# Patient Record
Sex: FEMALE | Race: WHITE | Employment: STUDENT | URBAN - METROPOLITAN AREA
[De-identification: names, ages, dates, MRNs, and addresses within clinical notes are randomized per-mention and may not be internally consistent; named-entity substitution may affect disease eponyms.]

---
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
## 2021-04-20 ENCOUNTER — OFFICE VISIT (OUTPATIENT)
Dept: INTERNAL MEDICINE CLINIC | Age: 21
End: 2021-04-20
Payer: COMMERCIAL

## 2021-04-20 VITALS
HEART RATE: 107 BPM | WEIGHT: 150.38 LBS | OXYGEN SATURATION: 97 % | TEMPERATURE: 98.7 F | DIASTOLIC BLOOD PRESSURE: 83 MMHG | RESPIRATION RATE: 16 BRPM | BODY MASS INDEX: 22.27 KG/M2 | HEIGHT: 69 IN | SYSTOLIC BLOOD PRESSURE: 107 MMHG

## 2021-04-20 DIAGNOSIS — F32.A DEPRESSION, UNSPECIFIED DEPRESSION TYPE: Primary | ICD-10-CM

## 2021-04-20 DIAGNOSIS — K21.00 GASTROESOPHAGEAL REFLUX DISEASE WITH ESOPHAGITIS, UNSPECIFIED WHETHER HEMORRHAGE: ICD-10-CM

## 2021-04-20 DIAGNOSIS — Z76.89 ENCOUNTER TO ESTABLISH CARE: ICD-10-CM

## 2021-04-20 PROCEDURE — 99204 OFFICE O/P NEW MOD 45 MIN: CPT | Performed by: INTERNAL MEDICINE

## 2021-04-20 RX ORDER — SERTRALINE HYDROCHLORIDE 100 MG/1
100 TABLET, FILM COATED ORAL DAILY
Qty: 30 TAB | Refills: 5 | Status: SHIPPED | OUTPATIENT
Start: 2021-04-20 | End: 2021-10-21 | Stop reason: SDUPTHER

## 2021-04-20 RX ORDER — PANTOPRAZOLE SODIUM 40 MG/1
40 TABLET, DELAYED RELEASE ORAL DAILY
Qty: 30 TAB | Refills: 5 | Status: SHIPPED | OUTPATIENT
Start: 2021-04-20

## 2021-04-20 NOTE — PROGRESS NOTES
Mayank Delgado (: 2000) is a 24 y.o. female, new patient, here for evaluation of the following chief complaint(s):  Chief Complaint   Patient presents with    New Patient     Patient not fasting.  Establish Care       Assessment and Plan:       ICD-10-CM ICD-9-CM    1. Depression, unspecified depression type  F32.9 311 sertraline (Zoloft) 100 mg tablet   2. Encounter to establish care  Z76.89 V65.8    3. Gastroesophageal reflux disease with esophagitis, unspecified whether hemorrhage  K21.00 530.11 pantoprazole (PROTONIX) 40 mg tablet      REFERRAL TO GASTROENTEROLOGY       1. Continue current medication reviewed with pt at visit. 3.  Trial medication and GI evaluation reviewed. Follow-up and Dispositions    · Return in about 3 months (around 2021), or if symptoms worsen or fail to improve, for medication follow-up, referral follow-up.       reviewed diet, exercise and weight control  reviewed medications and side effects in detail    For additional documentation of information and/or recommendations discussed this visit, please see notes in instructions. Plan and evaluation (above) reviewed with pt at visit  Patient voiced understanding of plan and provided with time to ask/review questions. After Visit Summary (AVS) provided to pt after visit with additional instructions as needed/reviewed. No future appointments. --Updated future visits after patient check-out. History of Present Illness:     Notes (nursing/rooming note copied below in italics):  As above    Here to establish care. Pt has records in [facility]. Records reviewed at visit as below:  --Path and labs for appendectomy   --Had post-op visit for laparoscopic appy      She notes she has been with Dr. Kristen Coronado for her primary care and medication follow-up. She has been on Zoloft for past 6-7 years.     She has been on Zoloft current dose for several years--reached this dosing in first year of treatment. She saw Dr. Burnard Hodgkin last 3mo ago, and did physical there then. Notes no other chronic medical problems. 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 No long-term problems. No regular ortho follow-up since surgery. She notes concern for acid reflux. Notes worse over past few years. She was taking Zantac and not helping. She notes will make a loud hiccuping sound, and will have this with acid reflux which is painful. This occurs up to 14 times daily for past 6mo. Has been having for past 4yrs. Will have up to BID then, now more frequent and more painful. Medication dosing and referral for GI eval reviewed. Release(s) completed at visit for:  PCP notes, last physical, labs, immunization records. Nursing screenings reviewed by provider at visit. Past Medical History:   Diagnosis Date    Anxiety     Depression     GERD (gastroesophageal reflux disease)      Past Surgical History:   Procedure Laterality Date    HX APPENDECTOMY  01/11/2018    appendectomy by Dr. Yenifer Rendon        Prior to Admission medications    Medication Sig Start Date End Date Taking? Authorizing Provider   sertraline (ZOLOFT) 100 mg tablet Take 100 mg by mouth daily. Yes Other, MD Vickie   oxyCODONE-acetaminophen (PERCOCET) 5-325 mg per tablet Take 1-2 Tabs by mouth every four (4) hours as needed for Pain. Max Daily Amount: 12 Tabs. 1/12/18   Ladarius Inman MD   multivit-min-FA-herbal no. 245 (ALIVE WOMEN'S GUMMY VITAMINS) 200 mcg- 37.5 mg chew Take 1 Tab by mouth daily. Provider, Historical        ROS  Complete ROS negative except as indicated in note. Vitals:    04/20/21 1342   BP: 107/83   Pulse: (!) 107   Resp: 16   Temp: 98.7 °F (37.1 °C)   TempSrc: Oral   SpO2: 97%   Weight: 150 lb 6 oz (68.2 kg)   Height: 5' 9\" (1.753 m)   PainSc:   0 - No pain      Body mass index is 22.21 kg/m². Physical Exam:     Physical Exam  Vitals signs and nursing note reviewed.    Constitutional: General: She is not in acute distress. Appearance: Normal appearance. She is well-developed. She is not diaphoretic. HENT:      Head: Normocephalic and atraumatic. Mouth/Throat:      Mouth: Mucous membranes are moist.   Eyes:      General: No scleral icterus. Right eye: No discharge. Left eye: No discharge. Conjunctiva/sclera: Conjunctivae normal.   Neck:      Musculoskeletal: Neck supple. No neck rigidity or muscular tenderness. Cardiovascular:      Rate and Rhythm: Normal rate and regular rhythm. Pulses: Normal pulses. Heart sounds: Normal heart sounds. No murmur. No friction rub. No gallop. Pulmonary:      Effort: Pulmonary effort is normal. No respiratory distress. Breath sounds: Normal breath sounds. No stridor. No wheezing, rhonchi or rales. Abdominal:      General: Bowel sounds are normal. There is no distension. Palpations: Abdomen is soft. There is no mass. Tenderness: There is no abdominal tenderness. There is no guarding. Comments: No mid-epigastric or bilateral UQ pain. Musculoskeletal:         General: No swelling, tenderness, deformity or signs of injury. Right lower leg: No edema. Left lower leg: No edema. Legs:    Lymphadenopathy:      Cervical: No cervical adenopathy. Skin:     General: Skin is warm. Coloration: Skin is not jaundiced or pale. Findings: No bruising, erythema or rash. Neurological:      General: No focal deficit present. Mental Status: She is alert. Motor: No abnormal muscle tone. Coordination: Coordination normal.      Gait: Gait normal.   Psychiatric:         Mood and Affect: Mood normal.         Behavior: Behavior normal.         Thought Content: Thought content normal.         Judgment: Judgment normal.         An electronic signature was used to authenticate this note.   -- Roxann Mcelroy MD

## 2021-04-20 NOTE — PROGRESS NOTES
RM 16    Chief Complaint   Patient presents with    New Patient     Patient not fasting.  Establish Care       1. Have you been to the ER, urgent care clinic since your last visit? Hospitalized since your last visit? No    2. Have you seen or consulted any other health care providers outside of the [address] [place] [address] since your last visit? Include any pap smears or colon screening. No    Health Maintenance Due   Topic Date Due    Hepatitis C Screening  Never done    HPV Age 9Y-34Y (1 - 2-dose series) Never done    COVID-19 Vaccine (1) Never done    DTaP/Tdap/Td series (1 - Tdap) Never done    PAP AKA CERVICAL CYTOLOGY  Never done       No flowsheet data found.     3 most recent PHQ Screens 1/31/2018   PHQ Not Done Active Diagnosis of Depression or Bipolar Disorder       Learning Assessment 1/31/2018   PRIMARY LEARNER Patient   HIGHEST LEVEL OF EDUCATION - PRIMARY LEARNER  DID NOT GRADUATE HIGH SCHOOL   BARRIERS PRIMARY LEARNER NONE   PRIMARY LANGUAGE ENGLISH   LEARNER PREFERENCE PRIMARY DEMONSTRATION     LISTENING   LEARNING SPECIAL TOPICS no   ANSWERED BY self   RELATIONSHIP SELF

## 2021-10-21 DIAGNOSIS — F32.A DEPRESSION, UNSPECIFIED DEPRESSION TYPE: ICD-10-CM

## 2021-10-21 NOTE — TELEPHONE ENCOUNTER
Please contact patient for scheduling. Thanks      Pt was a pt of Dr. Josue Pérez and has now established care with Dr. Isiah Yancey. Pt left a voice message requesting a refill. Last visit 04/20/2021 MD Aba Whiting   Next appointment 3 months (07/2021) - Nothing scheduled   Previous refill encounter(s)   04/20/2021 Zoloft #30 with 5 refills    Requested Prescriptions     Pending Prescriptions Disp Refills    sertraline (Zoloft) 100 mg tablet 90 Tablet 0     Sig: Take 1 Tablet by mouth daily.

## 2021-10-23 RX ORDER — SERTRALINE HYDROCHLORIDE 100 MG/1
100 TABLET, FILM COATED ORAL DAILY
Qty: 90 TABLET | Refills: 0 | Status: SHIPPED | OUTPATIENT
Start: 2021-10-23 | End: 2021-10-25 | Stop reason: SDUPTHER

## 2021-10-24 NOTE — TELEPHONE ENCOUNTER
Refill request(s) approved--sertraline--90-day supply with 0 refill(s).     Future Appointments   Date Time Provider Daron Daylin   10/25/2021  2:15 PM Obie Victoria MD [facility]

## 2021-10-25 ENCOUNTER — OFFICE VISIT (OUTPATIENT)
Dept: INTERNAL MEDICINE CLINIC | Age: 21
End: 2021-10-25
Payer: COMMERCIAL

## 2021-10-25 VITALS
SYSTOLIC BLOOD PRESSURE: 100 MMHG | RESPIRATION RATE: 16 BRPM | DIASTOLIC BLOOD PRESSURE: 69 MMHG | OXYGEN SATURATION: 98 % | BODY MASS INDEX: 22.81 KG/M2 | HEIGHT: 69 IN | HEART RATE: 74 BPM | WEIGHT: 154 LBS | TEMPERATURE: 97.6 F

## 2021-10-25 DIAGNOSIS — F32.A DEPRESSION, UNSPECIFIED DEPRESSION TYPE: Primary | ICD-10-CM

## 2021-10-25 DIAGNOSIS — H92.03 EAR PAIN, BILATERAL: ICD-10-CM

## 2021-10-25 PROCEDURE — 99214 OFFICE O/P EST MOD 30 MIN: CPT | Performed by: INTERNAL MEDICINE

## 2021-10-25 RX ORDER — SERTRALINE HYDROCHLORIDE 100 MG/1
100 TABLET, FILM COATED ORAL DAILY
Qty: 90 TABLET | Refills: 1 | Status: SHIPPED | OUTPATIENT
Start: 2021-10-25 | End: 2022-05-20 | Stop reason: SDUPTHER

## 2021-10-25 NOTE — PROGRESS NOTES
Riesa Hammans (: 2000) is a 24 y.o. female, established patient, here for evaluation of the following chief complaint(s):  Chief Complaint   Patient presents with    Medication Evaluation    Other     Endoscopy since last visit       Assessment and Plan:       ICD-10-CM ICD-9-CM    1. Depression, unspecified depression type  F32. A 311 sertraline (Zoloft) 100 mg tablet   2. Ear pain, bilateral  H92.03 388.70        1. Refill reviewed--6mo supply as 90-day script reviewed. Prior had been 90-day with no refills. Reviewed dose decrease if ready at follow-up as discussed at visit. 2.  Reviewed findings and mgt with pt at visit. No need for antibiotic reviewed with pt based on findings. Continue OTC Flonase PRN as currently. Follow-up and Dispositions    · Return in about 6 months (around 2022), or if symptoms worsen or fail to improve, for medication follow-up.       reviewed medications and side effects in detail    For additional documentation of information and/or recommendations discussed this visit, please see notes in instructions. Plan and evaluation (above) reviewed with pt at visit  Patient voiced understanding of plan and provided with time to ask/review questions. After Visit Summary (AVS) provided to pt after visit with additional instructions as needed/reviewed. No future appointments. --Updated future visits after patient check-out. History of Present Illness:     Notes (nursing/rooming note copied below in italics):  Patient states she would like to try to get off of anti depressants at some point. Here for medication follow-up. Established care 2021 with me. Reviewed history of sertraline use. She notes since 13yr to 12yr old has been on medication. Reviewed dosing if planning to decrease. If she is interested, may do so prior to follow-up. Reviewed and thinks can split tab if needed to get to 50mg dose.     Discussed 50mg/100mg alternating, or just decrease to 50mg daily. She has had SE's when misses doses. Gradual taper off medication reviewed PRN. Reviewed if does 4-6 weeks prior to follow-up can monitor response. She has seasonal/change of season infections--managed with peds prior with antibiotics PRN. Reviewed and she uses Flonase with season change to help manage possible Eustachian tube dysfunction. Ear exam reviewed at visit today. Nursing screenings reviewed by provider at visit. Prior to Admission medications    Medication Sig Start Date End Date Taking? Authorizing Provider   sertraline (Zoloft) 100 mg tablet Take 1 Tablet by mouth daily. 10/23/21  Yes Leo Bauman MD   pantoprazole (PROTONIX) 40 mg tablet Take 1 Tab by mouth daily. 4/20/21  Yes Leo Bauman MD        ROS    Vitals:    10/25/21 1432   BP: 100/69   Pulse: 74   Resp: 16   Temp: 97.6 °F (36.4 °C)   SpO2: 98%   Weight: 154 lb (69.9 kg)   Height: 5' 9\" (1.753 m)   PainSc:   0 - No pain   LMP: 09/24/2021      Body mass index is 22.74 kg/m². Physical Exam:     Physical Exam  Vitals and nursing note reviewed. Constitutional:       General: She is not in acute distress. Appearance: Normal appearance. She is well-developed. She is not diaphoretic. HENT:      Head: Normocephalic and atraumatic. Right Ear: Tympanic membrane, ear canal and external ear normal.      Left Ear: Tympanic membrane, ear canal and external ear normal.      Ears:      Comments: Right TM with small amount fluid possibly--reviewed with pt. Right TM slightly unclear/not transparent/translucent, with slightly splayed light reflex. Mouth/Throat:      Mouth: Mucous membranes are moist.   Eyes:      General: No scleral icterus. Right eye: No discharge. Left eye: No discharge. Conjunctiva/sclera: Conjunctivae normal.   Cardiovascular:      Rate and Rhythm: Normal rate and regular rhythm. Pulses: Normal pulses. Heart sounds: Normal heart sounds. No murmur heard. No friction rub. No gallop. Pulmonary:      Effort: Pulmonary effort is normal. No respiratory distress. Breath sounds: Normal breath sounds. No stridor. No wheezing, rhonchi or rales. Abdominal:      General: Bowel sounds are normal. There is no distension. Palpations: Abdomen is soft. Tenderness: There is no abdominal tenderness. There is no guarding. Musculoskeletal:         General: No swelling, tenderness, deformity or signs of injury. Skin:     General: Skin is warm. Coloration: Skin is not jaundiced or pale. Findings: No bruising, erythema or rash. Neurological:      General: No focal deficit present. Mental Status: She is alert. Motor: No abnormal muscle tone. Coordination: Coordination normal.      Gait: Gait normal.   Psychiatric:         Mood and Affect: Mood normal.         Behavior: Behavior normal.         Thought Content: Thought content normal.         Judgment: Judgment normal.         An electronic signature was used to authenticate this note.   -- Jonny Brower MD

## 2021-10-25 NOTE — PROGRESS NOTES
Chief Complaint   Patient presents with    Medication Evaluation     Patient states she would like to try to get off of anti depressants at some point. 3 most recent PHQ Screens 10/25/2021   PHQ Not Done -   Little interest or pleasure in doing things Not at all   Feeling down, depressed, irritable, or hopeless Not at all   Total Score PHQ 2 0       Visit Vitals  /69 (BP 1 Location: Left arm, BP Patient Position: Sitting, BP Cuff Size: Small adult)   Pulse 74   Temp 97.6 °F (36.4 °C)   Resp 16   Ht 5' 9\" (1.753 m)   Wt 154 lb (69.9 kg)   SpO2 98%   BMI 22.74 kg/m²     1. Have you been to the ER, urgent care clinic since your last visit? Hospitalized since your last visit?no    2. Have you seen or consulted any other health care providers outside of the [address] [place] [address] since your last visit? Include any pap smears or colon screening.  no

## 2021-10-25 NOTE — PATIENT INSTRUCTIONS
As reviewed, you can decrease the sertraline to 50mg (1/2 of a 100mg tab) daily prior to your next visit.

## 2021-10-27 NOTE — TELEPHONE ENCOUNTER
Writer left message that her medication was approved and sent to her pharmacy,pt was just seen by  on 10/25/21.

## 2022-03-19 PROBLEM — K35.80 APPENDICITIS, ACUTE: Status: ACTIVE | Noted: 2018-01-12

## 2022-05-19 DIAGNOSIS — F32.A DEPRESSION, UNSPECIFIED DEPRESSION TYPE: ICD-10-CM

## 2022-05-19 RX ORDER — SERTRALINE HYDROCHLORIDE 100 MG/1
100 TABLET, FILM COATED ORAL DAILY
Qty: 90 TABLET | Refills: 0 | Status: CANCELLED | OUTPATIENT
Start: 2022-05-19

## 2022-05-19 NOTE — TELEPHONE ENCOUNTER
Last visit 10/25/2021 MD Danielle Dee   Next appointment 05/20/2022 MD Danielle Dee   Previous refill encounter(s)   10/25/2021 Zoloft #90 with 1 refill. Requested Prescriptions     Pending Prescriptions Disp Refills    sertraline (Zoloft) 100 mg tablet 90 Tablet 0     Sig: Take 1 Tablet by mouth daily.

## 2022-05-20 ENCOUNTER — OFFICE VISIT (OUTPATIENT)
Dept: INTERNAL MEDICINE CLINIC | Age: 22
End: 2022-05-20
Payer: COMMERCIAL

## 2022-05-20 VITALS
RESPIRATION RATE: 22 BRPM | WEIGHT: 165 LBS | BODY MASS INDEX: 24.37 KG/M2 | HEART RATE: 80 BPM | TEMPERATURE: 98.2 F | SYSTOLIC BLOOD PRESSURE: 116 MMHG | DIASTOLIC BLOOD PRESSURE: 77 MMHG | OXYGEN SATURATION: 100 %

## 2022-05-20 DIAGNOSIS — F32.A DEPRESSION, UNSPECIFIED DEPRESSION TYPE: ICD-10-CM

## 2022-05-20 DIAGNOSIS — Z00.00 WELL ADULT HEALTH CHECK: ICD-10-CM

## 2022-05-20 DIAGNOSIS — G90.9 AUTONOMIC DYSFUNCTION: ICD-10-CM

## 2022-05-20 DIAGNOSIS — R55 SYNCOPE, UNSPECIFIED SYNCOPE TYPE: Primary | ICD-10-CM

## 2022-05-20 PROCEDURE — 99215 OFFICE O/P EST HI 40 MIN: CPT | Performed by: INTERNAL MEDICINE

## 2022-05-20 RX ORDER — UREA 10 %
100 LOTION (ML) TOPICAL DAILY
COMMUNITY

## 2022-05-20 RX ORDER — ERGOCALCIFEROL 1.25 MG/1
50000 CAPSULE ORAL
COMMUNITY

## 2022-05-20 RX ORDER — CHOLECALCIFEROL (VITAMIN D3) 125 MCG
CAPSULE ORAL
COMMUNITY

## 2022-05-20 RX ORDER — SERTRALINE HYDROCHLORIDE 100 MG/1
100 TABLET, FILM COATED ORAL DAILY
Qty: 90 TABLET | Refills: 1 | Status: SHIPPED | OUTPATIENT
Start: 2022-05-20

## 2022-05-20 NOTE — PROGRESS NOTES
Identified pt with two pt identifiers(name and ). Chief Complaint   Patient presents with    Loss of Consciousness     History since  5years old, approximately happens 1-2 a month. Post Covid December, happens 3-4 times a week     Other     Rm 16      Vitals:    22 1105   BP: 116/77   Pulse: 80   Resp: 22   Temp: 98.2 °F (36.8 °C)   TempSrc: Oral   SpO2: 100%   Weight: 165 lb (74.8 kg)   PainSc:   0 - No pain      Health Maintenance Due   Topic    Hepatitis C Screening     HPV Age 9Y-34Y (1 - 2-dose series)    DTaP/Tdap/Td series (1 - Tdap)    Pap Smear     COVID-19 Vaccine (2 - Moderna 3-dose series)       Depression Screening:  :     3 most recent PHQ Screens 2022   PHQ Not Done -   Little interest or pleasure in doing things Not at all   Feeling down, depressed, irritable, or hopeless Not at all   Total Score PHQ 2 0        Fall Risk Assessment:  :     No flowsheet data found. Abuse Screening:  :     No flowsheet data found. Coordination of Care Questionnaire:  :     1. Have you been to the ER, urgent care clinic since your last visit? Hospitalized since your last visit? No    2. Have you seen or consulted any other health care providers outside of the [address] [place] [address] since your last visit? Include any pap smears or colon screening.  No

## 2022-05-20 NOTE — PATIENT INSTRUCTIONS
1.  Labs are not fasting, but should be done by mid-morning. You can do here and have done in an exam room, so will be safer if you faint then with labs. 2.  Dr. Tara Kiran is not with [address] currently. Dr. Lizz Patton is a [address] provider. 3.  Please follow the following instructions to process/authorize your referral, if needed:    Referrals processing  Please verify with your insurance IF you need referral authorization submitted. For insurance plans which require this, please follow the following steps. FAILURE TO DO SO MAY RESULT IN INABILITY TO SEE THE SPECIALIST YOU HAVE BEEN REFERRED TO (once you are scheduled to see them). 1. Call and schedule appointment with specialist  2. Call our clinic and leave message with provider name, and date of appointment  3. We will then submit the referral to your insurance. This process takes 2-5 business days. If you have questions about scheduling or authorizing referral, you can review with our referral coordinator. You can review with her today if available/if you have time, or you can call to review once you have made your referral/appointment. If you are not sure if you need referral authorizations, please review with the referral coordinator(s), either prior to or after you have made the appointment, as reviewed.

## 2022-05-20 NOTE — PROGRESS NOTES
Moy Biswas (: 2000) is a 25 y.o. female, established patient, here for evaluation of the following chief complaint(s):  Chief Complaint   Patient presents with    Loss of Consciousness     History since  5years old, approximately happens 1-2 a month. Post Covid December, happens 3-4 times a week     Other     Rm 16       Assessment and Plan:       ICD-10-CM ICD-9-CM    1. Syncope, unspecified syncope type  R55 780.2 REFERRAL TO CARDIOLOGY      REFERRAL TO CARDIOLOGY      CBC WITH AUTOMATED DIFF      METABOLIC PANEL, COMPREHENSIVE      HEMOGLOBIN A1C WITH EAG      FOLATE      VITAMIN B12      TSH 3RD GENERATION      T4, FREE      CORTISOL, AM      VITAMIN D, 25 HYDROXY   2. Autonomic dysfunction  G90.9 337.9 REFERRAL TO CARDIOLOGY      REFERRAL TO CARDIOLOGY   3. Depression, unspecified depression type  F32. A 311 sertraline (Zoloft) 100 mg tablet   4. Well adult health check  Z00.00 V70.0 CBC WITH AUTOMATED DIFF      METABOLIC PANEL, COMPREHENSIVE      HEMOGLOBIN A1C WITH EAG      TSH 3RD GENERATION      T4, FREE       1,2,4. Referral(s) and referral coordination reviewed with patient at visit. Lab evaluation reviewed with pt at visit. She will return for AM labs here--since plan AM cortisol for evaluation. 3.  Refill and mgt/follow-up reviewed. Follow-up and Dispositions    · Return in about 2 months (around 2022), or if symptoms worsen or fail to improve, for referral follow-up.       lab results and schedule of future lab studies reviewed with patient  reviewed medications and side effects in detail  radiology results and schedule of future radiology studies reviewed with patient    For additional documentation of information and/or recommendations discussed this visit, please see notes in instructions. Plan and evaluation (above) reviewed with pt at visit  Patient voiced understanding of plan and provided with time to ask/review questions.   After Visit Summary (AVS) provided to pt after visit with additional instructions as needed/reviewed. Future Appointments   Date Time Provider Daron Daylin   7/20/2022 10:00 AM David Haines MD [facility]   --Updated future visits after patient check-out. On this date 05/20/2022 I have spent 41 minutes reviewing previous notes, test results and face to face with the patient discussing the diagnosis and importance of compliance with the treatment plan as well as documenting on the day of the visit. History of Present Illness:     Notes (nursing/rooming note copied below in italics):  As above. Established care here Oct 2021. Refilled Zoloft then, with 6mo refillsn and planned follow-up 6mo (4/25). First follow-up visit today. The problem noted above has not been evaluated prior here. She thinks saw cardiology in 2011 with Peds cardiology. She has been trying to do more Gatorade, and salt-intake. She has not seen cardiology as adult to evaluate. She had EKG only with cardiology. She will have about a 5second window with sweating, and vision changes. She doesn't usually collapse, but she can usually lay down with symptoms to prevent passing out/fainting. She has limited perspiration, and feeling like not regulating body temperature as much. She has noted this is worse after COVID. She would prefer to start with cardiology. Plan EKG there. Nursing screenings reviewed by provider at visit. Prior to Admission medications    Medication Sig Start Date End Date Taking? Authorizing Provider   cyanocobalamin (Vitamin B-12) 100 mcg tablet Take 100 mcg by mouth daily. Yes Provider, Historical   ergocalciferol (Vitamin D2) 1,250 mcg (50,000 unit) capsule Take 50,000 Units by mouth. Yes Provider, Historical   melatonin 5 mg tablet Take  by mouth nightly. Yes Provider, Historical   sertraline (Zoloft) 100 mg tablet Take 1 Tablet by mouth daily.  10/25/21  Yes Darryl Lam Karson Benavides MD   pantoprazole (PROTONIX) 40 mg tablet Take 1 Tab by mouth daily. Patient not taking: Reported on 5/20/2022 4/20/21   Gabino Evans MD        ROS    Vitals:    05/20/22 1105   BP: 116/77   Pulse: 80   Resp: 22   Temp: 98.2 °F (36.8 °C)   TempSrc: Oral   SpO2: 100%   Weight: 165 lb (74.8 kg)   PainSc:   0 - No pain        Vitals:    05/20/22 1105 05/20/22 1125 05/20/22 1126 05/20/22 1127   BP: 116/77      BP 2:  100/64 110/70 116/80   BP 1 Location: Right arm Right arm Right arm Right arm   BP Patient Position: Sitting Supine Sitting Standing   BP Cuff Size: Adult Adult Adult Adult   Pulse: 80      Pulse 2:  83 74 84   Temp: 98.2 °F (36.8 °C)      TempSrc: Oral      Resp: 22      Weight: 165 lb (74.8 kg)      SpO2: 100%          Body mass index is 24.37 kg/m². Physical Exam:     Physical Exam  Vitals and nursing note reviewed. Constitutional:       General: She is not in acute distress. Appearance: Normal appearance. She is well-developed. She is not diaphoretic. HENT:      Head: Normocephalic and atraumatic. Eyes:      General: No scleral icterus. Right eye: No discharge. Left eye: No discharge. Conjunctiva/sclera: Conjunctivae normal.   Cardiovascular:      Rate and Rhythm: Normal rate and regular rhythm. Pulses: Normal pulses. Heart sounds: Normal heart sounds. No murmur heard. No friction rub. No gallop. Pulmonary:      Effort: Pulmonary effort is normal. No respiratory distress. Breath sounds: Normal breath sounds. No stridor. No wheezing, rhonchi or rales. Abdominal:      General: Bowel sounds are normal. There is no distension. Palpations: Abdomen is soft. Tenderness: There is no abdominal tenderness. There is no guarding. Musculoskeletal:         General: No swelling, tenderness, deformity or signs of injury. Skin:     General: Skin is warm. Coloration: Skin is not jaundiced or pale.       Findings: No bruising, erythema or rash. Neurological:      General: No focal deficit present. Mental Status: She is alert. Motor: No abnormal muscle tone. Coordination: Coordination normal.      Gait: Gait normal.   Psychiatric:         Mood and Affect: Mood normal.         Behavior: Behavior normal.         Thought Content: Thought content normal.         Judgment: Judgment normal.         An electronic signature was used to authenticate this note.   -- Josiah Ortez MD

## 2022-05-28 NOTE — TELEPHONE ENCOUNTER
[place]   Outcome Expressed gratitude   Spiritual/[employer]   Type Spiritual support Sertraline refilled at visit on 5-20-22.

## 2022-10-20 ENCOUNTER — NURSE TRIAGE (OUTPATIENT)
Dept: OTHER | Facility: CLINIC | Age: 22
End: 2022-10-20

## 2022-10-20 NOTE — TELEPHONE ENCOUNTER
Location of patient: [address] nagel from Estefany Kim at [facility] with [employer]. Subjective: Caller states \"I have bronchitis\"     Current Symptoms: Cough for 3 weeks. Chest is hurting from coughing, feels sore even when not coughing. SOB with moving around. Got better with steroids and once completed those her sx got worse again. Coughing up green mucous, no blood. Can taste blood. Sore throat. Onset: 1 week ago; worsening    Associated Symptoms: reduced activity    Pain Severity: 5/10; sore; constant, moderate    Temperature: denies fever     What has been tried: Albuterol Inhaler    LMP:  Pregnant:     Recommended disposition: Go to ED Now    Care advice provided, patient verbalizes understanding; denies any other questions or concerns; instructed to call back for any new or worsening symptoms. Tried to reach PCP office and no answer after 10 minutes on hold. Still advised patient to be seen in ED and that will send message to the office as a high priority as well. Attention Provider: Thank you for allowing me to participate in the care of your patient. The patient was connected to triage in response to information provided to the [facility]. Please do not respond through this encounter as the response is not directed to a shared pool.     Reason for Disposition   Chest pain present when not coughing    Protocols used: Cough-ADULT-OH

## 2022-10-21 ENCOUNTER — TELEPHONE (OUTPATIENT)
Dept: INTERNAL MEDICINE CLINIC | Age: 22
End: 2022-10-21

## 2022-10-21 NOTE — TELEPHONE ENCOUNTER
Future Appointments:  Future Appointments   Date Time Provider Daron Daylin   10/21/2022  3:30 PM Chad Benavidez MD [facility] BS AMB        Last Appointment With Me:  5/20/2022     Above sick clinic visit booked attempted to call patient no answer left detailed message with appt info.

## 2022-10-21 NOTE — TELEPHONE ENCOUNTER
----- Message from Woodymeliton Malia sent at 10/20/2022  4:06 PM EDT -----  Subject: Message to Provider    QUESTIONS  Information for Provider? Patient calling for pcp Jae Bob MD to see if he got the message from Nurse Triage today. Wanting to talk   to someone asap rather than utilize nt disposition of go to ed now. Please   call asap to discuss patient needs. ---------------------------------------------------------------------------  --------------  Karen Carrera [facility]  [phone]; OK to leave message on voicemail  ---------------------------------------------------------------------------  --------------  SCRIPT ANSWERS  Relationship to Patient?  Self

## 2022-12-19 DIAGNOSIS — F32.A DEPRESSION, UNSPECIFIED DEPRESSION TYPE: ICD-10-CM

## 2022-12-19 NOTE — TELEPHONE ENCOUNTER
Last visit 05/20/2022 MD Katie Avila   Next appointment No show 10/21/2022 - Nothing rescheduled   Previous refill encounter(s)   05/20/2022 Zoloft #90 with 1 refill. For Pharmacy Admin Tracking Only    Program: Medication Refill  Intervention Detail: New Rx: 1, reason: Patient Preference  Time Spent (min): 5      Requested Prescriptions     Pending Prescriptions Disp Refills    sertraline (Zoloft) 100 mg tablet 90 Tablet 0     Sig: Take 1 Tablet by mouth daily.

## 2023-01-11 RX ORDER — SERTRALINE HYDROCHLORIDE 100 MG/1
100 TABLET, FILM COATED ORAL DAILY
Qty: 90 TABLET | Refills: 0 | Status: SHIPPED | OUTPATIENT
Start: 2023-01-11

## 2023-03-29 DIAGNOSIS — F32.A DEPRESSION, UNSPECIFIED DEPRESSION TYPE: ICD-10-CM

## 2023-03-30 NOTE — TELEPHONE ENCOUNTER
Last Visit: 5/20/22 with MD Boo Vargas  Next Appointment: no show 10/21/22  Previous Refill Encounter(s): 1/11/23 #90    Requested Prescriptions     Pending Prescriptions Disp Refills    sertraline (Zoloft) 100 mg tablet 90 Tablet 0     Sig: Take 1 Tablet by mouth daily. For Pharmacy Admin Tracking Only    Program: Medication Refill  CPA in place:   Recommendation Provided To:    Intervention Detail: New Rx: 1, reason: Patient Preference and Scheduled Appointment  Intervention Accepted By:   Azra Oden Closed?:   Time Spent (min): 5

## 2023-04-01 RX ORDER — SERTRALINE HYDROCHLORIDE 100 MG/1
100 TABLET, FILM COATED ORAL DAILY
Qty: 90 TABLET | Refills: 0 | Status: SHIPPED | OUTPATIENT
Start: 2023-04-01

## 2023-04-01 NOTE — TELEPHONE ENCOUNTER
Refill request(s) approved--sertraline--90-day supply with 0 refill(s). No future appointments. MyChart message to pt--to schedule follow-up appt.

## 2023-06-22 ENCOUNTER — TELEPHONE (OUTPATIENT)
Age: 23
End: 2023-06-22

## 2023-06-28 ENCOUNTER — TELEPHONE (OUTPATIENT)
Age: 23
End: 2023-06-28

## 2023-07-07 RX ORDER — SERTRALINE HYDROCHLORIDE 100 MG/1
100 TABLET, FILM COATED ORAL DAILY
Qty: 30 TABLET | Refills: 0 | Status: SHIPPED | OUTPATIENT
Start: 2023-07-07

## 2023-07-07 NOTE — TELEPHONE ENCOUNTER
Last appointment: 05/20/2022 MD Wesly Arrington  Next appointment: 07/10/2023 MD Wesly Arrington   Previous refill encounter(s):   04/01/2023 Zoloft #90     For Pharmacy Admin Tracking Only    Program: Medication Refill  Intervention Detail: New Rx: 1, reason: Patient Preference  Time Spent (min): 5      Requested Prescriptions     Pending Prescriptions Disp Refills    sertraline (ZOLOFT) 100 MG tablet 30 tablet 0     Sig: Take 1 tablet by mouth daily

## 2023-07-10 ENCOUNTER — TELEMEDICINE (OUTPATIENT)
Age: 23
End: 2023-07-10
Payer: COMMERCIAL

## 2023-07-10 DIAGNOSIS — F34.1 PERSISTENT DEPRESSIVE DISORDER: Primary | ICD-10-CM

## 2023-07-10 DIAGNOSIS — Z87.898 HISTORY OF SYNCOPE: ICD-10-CM

## 2023-07-10 PROCEDURE — 99213 OFFICE O/P EST LOW 20 MIN: CPT | Performed by: INTERNAL MEDICINE

## 2023-07-10 RX ORDER — PROPRANOLOL HYDROCHLORIDE 10 MG/1
TABLET ORAL
COMMUNITY
Start: 2023-06-20

## 2023-07-10 RX ORDER — SERTRALINE HYDROCHLORIDE 100 MG/1
100 TABLET, FILM COATED ORAL DAILY
Qty: 90 TABLET | Refills: 3 | Status: SHIPPED | OUTPATIENT
Start: 2023-07-10

## 2023-07-10 NOTE — PROGRESS NOTES
Virtual Visit       Chief Complaint   Patient presents with    Follow-up     Medication follow up              1. Have you been to the ER, urgent care clinic since your last visit? Hospitalized since your last visit?no    2. Have you seen or consulted any other health care providers outside of the [address] [place] [address] Avenue since your last visit? Include any pap smears or colon screening. no      There were no vitals filed for this visit.

## 2023-07-10 NOTE — PROGRESS NOTES
Shweta Chavez (: 2000) is a 21 y.o. female, established patient, here for evaluation of the following chief complaint(s)--see below:    Shweta Chavez is a 21 y.o. female who was seen by synchronous (real-time) audio-video technology on 7/10/2023. Consent: Shweta Chavez, who was seen by synchronous (real-time) audio-video technology, and/or her healthcare decision maker, is aware that this patient-initiated, Telehealth encounter on 7/10/2023 is a billable service, with coverage as determined by her insurance carrier. She is aware that she may receive a bill and has provided verbal consent to proceed: Yes. I was in the office while conducting this encounter. Assessment & Plan:   Diagnoses and all orders for this visit:     Diagnosis Orders   1. Persistent depressive disorder  sertraline (ZOLOFT) 100 MG tablet      2. History of syncope            1:  Refill(s) and management reviewed. Yearly follow-up reviewed for refills as needed. 2:  Notes seeing cardiology and POTS evaluation in place. Neuro eval notes in current Epic EMR. Return in about 1 year (around 7/10/2024), or if symptoms worsen or fail to improve, for medication follow-up.  reviewed medications and side effects in detail    Plan and evaluation (above) reviewed with pt at visit  Patient voiced understanding of plan and provided with time to ask/review questions. After Visit Summary (AVS) provided to pt after visit with additional instructions as needed/reviewed. AVS:  [x]  [facility] after visit signed. []  Mailed to patient after visit. []  Not sent to patient after visit. Future Appointments   Date Time Provider [address]   2023  9:00 AM ANS SCHEDULE [facility] BS AMB   --Updated future visits after patient check-out.       Subjective:   Shweta Chavez was seen for:  Chief Complaint   Patient presents with    Follow-up     Medication follow up        Notes:  Requested

## 2023-07-31 ENCOUNTER — PROCEDURE VISIT (OUTPATIENT)
Age: 23
End: 2023-07-31
Payer: COMMERCIAL

## 2023-07-31 DIAGNOSIS — R55 VASOVAGAL SYNCOPE: Primary | ICD-10-CM

## 2023-07-31 PROCEDURE — 93660 TILT TABLE EVALUATION: CPT | Performed by: PSYCHIATRY & NEUROLOGY

## 2023-07-31 NOTE — PROGRESS NOTES
[facility]  [address] [facility] [address] [facility], [address] [facility]  Zac John  Phone: [phone]  FAX: [phone]     Clinical Autonomic Testing Report     Patient ID:  Mary Shoemaker  [id number]  71 y.o.  2000     REFERRED BY: Ollie Childress MD  PCP: Nj Wolff MD    Date of Testin2023      Indication/History:     Episodes of passing out, heat intolerance and tachycardia. (+) anxiety    Patient is coming for syncope/autonomic dysfunction evaluation. Medications taken 48 hrs before the test: Sertraline     Procedure: Referring provider only requested Head-Up Tilt Table Testing. It is performed by utilizing [email or web address] System, with established protocol. Result:HUT (head-up tilt) : [id number] BP and HR were measured, up to 15 minutes post tilt. There is an abrupt fall of blood pressure from baseline BP of 116/74 to lowest BP of 80/50 at 2 mins post tilt with reduction of HR from baseline HR of 68 bpm to lowest HR of 43 bpm, associated with passing out.     Impression:   ABNORMAL    Abrupt fall of blood pressure (>30/15 mmHg) associated with bradycardia as can be seen in neurally mediated (vasovagal) syncope.  (i.e. Emotionally induced fainting, Carotid sinus syncope, micturition/defacation/coughing syncope)         Ant Hill MD  Diplomate, American Board of Psychiatry and Neurology  Diplomate, Neuromuscular Medicine  Diplomate, American Board of Electrodiagnostic Medicine    Note: Raw Data will be scanned separately in Media

## 2024-06-24 DIAGNOSIS — F34.1 PERSISTENT DEPRESSIVE DISORDER: ICD-10-CM

## 2024-06-24 RX ORDER — SERTRALINE HYDROCHLORIDE 100 MG/1
100 TABLET, FILM COATED ORAL DAILY
Qty: 30 TABLET | Refills: 1 | Status: SHIPPED | OUTPATIENT
Start: 2024-06-24

## 2024-06-24 NOTE — TELEPHONE ENCOUNTER
Refill request(s) approved--sertraline--30-day supply with 1 refill(s).    [facility] message to pt--to schedule follow-up appt.  Needs office visit.  Last office visit was May 2022.

## 2024-06-24 NOTE — TELEPHONE ENCOUNTER
Pt requested refill(s) via MYCHART     Last appointment: 07/10/2023 Virtual visit MD San   Next appointment: Sammy scheduled   Previous refill encounter(s):   07/10/2023 Zoloft #90 with 3 refills.     For Pharmacy Admin Tracking Only    Program: Medication Refill  Intervention Detail: New Rx: 1, reason: Patient Preference  Time Spent (min): 5    Requested Prescriptions     Pending Prescriptions Disp Refills    sertraline (ZOLOFT) 100 MG tablet 90 tablet 0     Sig: Take 1 tablet by mouth daily

## 2024-08-22 ENCOUNTER — OFFICE VISIT (OUTPATIENT)
Age: 24
End: 2024-08-22
Payer: COMMERCIAL

## 2024-08-22 VITALS
RESPIRATION RATE: 16 BRPM | DIASTOLIC BLOOD PRESSURE: 76 MMHG | TEMPERATURE: 98.4 F | HEART RATE: 60 BPM | HEIGHT: 69 IN | BODY MASS INDEX: 27.25 KG/M2 | WEIGHT: 184 LBS | OXYGEN SATURATION: 98 % | SYSTOLIC BLOOD PRESSURE: 114 MMHG

## 2024-08-22 DIAGNOSIS — F34.1 PERSISTENT DEPRESSIVE DISORDER: Primary | ICD-10-CM

## 2024-08-22 DIAGNOSIS — Z02.89 ENCOUNTER FOR COMPLETION OF FORM WITH PATIENT: ICD-10-CM

## 2024-08-22 DIAGNOSIS — G90.A POTS (POSTURAL ORTHOSTATIC TACHYCARDIA SYNDROME): ICD-10-CM

## 2024-08-22 PROCEDURE — 99214 OFFICE O/P EST MOD 30 MIN: CPT | Performed by: INTERNAL MEDICINE

## 2024-08-22 RX ORDER — MIDODRINE HYDROCHLORIDE 2.5 MG/1
2.5 TABLET ORAL PRN
COMMUNITY

## 2024-08-22 RX ORDER — SERTRALINE HYDROCHLORIDE 25 MG/1
25 TABLET, FILM COATED ORAL DAILY
Qty: 90 TABLET | Refills: 1 | Status: SHIPPED | OUTPATIENT
Start: 2024-08-22

## 2024-08-22 SDOH — ECONOMIC STABILITY: FOOD INSECURITY: WITHIN THE PAST 12 MONTHS, YOU WORRIED THAT YOUR FOOD WOULD RUN OUT BEFORE YOU GOT MONEY TO BUY MORE.: NEVER TRUE

## 2024-08-22 SDOH — ECONOMIC STABILITY: FOOD INSECURITY: WITHIN THE PAST 12 MONTHS, THE FOOD YOU BOUGHT JUST DIDN'T LAST AND YOU DIDN'T HAVE MONEY TO GET MORE.: NEVER TRUE

## 2024-08-22 SDOH — ECONOMIC STABILITY: INCOME INSECURITY: HOW HARD IS IT FOR YOU TO PAY FOR THE VERY BASICS LIKE FOOD, HOUSING, MEDICAL CARE, AND HEATING?: NOT HARD AT ALL

## 2024-08-22 ASSESSMENT — PATIENT HEALTH QUESTIONNAIRE - PHQ9
9. THOUGHTS THAT YOU WOULD BE BETTER OFF DEAD, OR OF HURTING YOURSELF: NOT AT ALL
4. FEELING TIRED OR HAVING LITTLE ENERGY: NOT AT ALL
1. LITTLE INTEREST OR PLEASURE IN DOING THINGS: NOT AT ALL
5. POOR APPETITE OR OVEREATING: NOT AT ALL
SUM OF ALL RESPONSES TO PHQ QUESTIONS 1-9: 0
SUM OF ALL RESPONSES TO PHQ9 QUESTIONS 1 & 2: 0
7. TROUBLE CONCENTRATING ON THINGS, SUCH AS READING THE NEWSPAPER OR WATCHING TELEVISION: NOT AT ALL
10. IF YOU CHECKED OFF ANY PROBLEMS, HOW DIFFICULT HAVE THESE PROBLEMS MADE IT FOR YOU TO DO YOUR WORK, TAKE CARE OF THINGS AT HOME, OR GET ALONG WITH OTHER PEOPLE: NOT DIFFICULT AT ALL
6. FEELING BAD ABOUT YOURSELF - OR THAT YOU ARE A FAILURE OR HAVE LET YOURSELF OR YOUR FAMILY DOWN: NOT AT ALL
2. FEELING DOWN, DEPRESSED OR HOPELESS: NOT AT ALL
3. TROUBLE FALLING OR STAYING ASLEEP: NOT AT ALL
SUM OF ALL RESPONSES TO PHQ QUESTIONS 1-9: 0
8. MOVING OR SPEAKING SO SLOWLY THAT OTHER PEOPLE COULD HAVE NOTICED. OR THE OPPOSITE, BEING SO FIGETY OR RESTLESS THAT YOU HAVE BEEN MOVING AROUND A LOT MORE THAN USUAL: NOT AT ALL

## 2024-08-22 NOTE — PROGRESS NOTES
RM: 18  Chief Complaint   Patient presents with    Medication Refill      Vitals:    08/22/24 1614   BP: 114/76   Pulse: 60   Resp: 16   Temp: 98.4 °F (36.9 °C)   SpO2: 98%      FASTING: No  Have you been to the ER, urgent care clinic since your last visit?  Hospitalized since your last visit?\"    NO  “Have you seen or consulted any other health care providers outside of [facility] since your last visit?”    NO    Pt needs her Zoloft refill     Click Here for Release of Records Request

## 2024-08-22 NOTE — PROGRESS NOTES
Casandra Boone (: 2000) is a 24 y.o. female, established patient, here for evaluation of the following chief complaint(s):  Chief Complaint   Patient presents with    Medication Refill       Assessment and Plan:      Diagnosis Orders   1. Persistent depressive disorder  sertraline (ZOLOFT) 25 MG tablet    sertraline (ZOLOFT) 50 MG tablet      2. POTS (postural orthostatic tachycardia syndrome)        3. Encounter for completion of form with patient--DMV Temporary form            1:  Refill(s) and management reviewed.  Reviewed dose adjustment from current 100mg daily dose.    2:  Management/following with specialist.    3:  Completed temporary/6mo form at visit today, as reviewed.      Requested Prescriptions     Signed Prescriptions Disp Refills    sertraline (ZOLOFT) 25 MG tablet 90 tablet 1     Sig: Take 1 tablet by mouth daily Take daily with 50mg tablet for total daily dose of 75mg (decrease from 100mg daily).    sertraline (ZOLOFT) 50 MG tablet 90 tablet 1     Sig: Take 1 tablet by mouth daily Take daily with 25mg tablet for total daily dose of 75mg (decrease from 100mg daily).       Return in about 6 months (around 2025) for medication follow-up.  reviewed medications and side effects in detail    For additional documentation of information and/or recommendations discussed this visit, please see notes in instructions.    Plan and evaluation (above) reviewed with pt at visit  Patient voiced understanding of plan and provided with time to ask/review questions.  After Visit Summary (AVS) provided to pt after visit with additional instructions as needed/reviewed.      No future appointments.  --Updated future visits after patient check-out.      History of Present Illness:     Notes (nursing/rooming note copied below in italics and/or gray-shaded):  As above and in nursing note.    FASTING: No     Here for medication follow-up.    LOV was for syndope evaluation May 2022--last office visit.    She  No gallop.   Pulmonary:      Effort: Pulmonary effort is normal. No respiratory distress.      Breath sounds: Normal breath sounds. No stridor. No wheezing, rhonchi or rales.   Abdominal:      General: Bowel sounds are normal. There is no distension.      Palpations: Abdomen is soft. There is no mass.      Tenderness: There is no abdominal tenderness.   Musculoskeletal:         General: No deformity.   Skin:     Findings: No lesion or rash.   Neurological:      Mental Status: She is alert.         An electronic signature was used to authenticate this note.  -- Celso San MD

## 2024-08-22 NOTE — PATIENT INSTRUCTIONS
Can do medication follow-up virtually if you just need to refill or adjust the sertraline.    If you need to do another temporary or permanent DMV form update, in-office would let us get you that with your visit, like today.

## 2024-11-13 ENCOUNTER — TELEPHONE (OUTPATIENT)
Age: 24
End: 2024-11-13

## 2024-11-14 NOTE — TELEPHONE ENCOUNTER
Called pt.  verified. Pt states her cardiologist treats her pots but she wants to make sure that nothing is going on neurological. Pt states that she had an focal seizure 3 wks ago that lasted 3 mins did not go to ER to be evaluated and it has not happened again. Pt also states that she has been having a lot of fainting episodes. Pt scheduled w/ Dr. Pérez on 11/15/24 to discuss her concerns.

## 2024-11-15 ENCOUNTER — OFFICE VISIT (OUTPATIENT)
Age: 24
End: 2024-11-15
Payer: COMMERCIAL

## 2024-11-15 VITALS
HEIGHT: 69 IN | OXYGEN SATURATION: 99 % | DIASTOLIC BLOOD PRESSURE: 70 MMHG | BODY MASS INDEX: 26.81 KG/M2 | HEART RATE: 104 BPM | TEMPERATURE: 95.9 F | SYSTOLIC BLOOD PRESSURE: 120 MMHG | WEIGHT: 181 LBS

## 2024-11-15 DIAGNOSIS — R55 VASOVAGAL SYNCOPE: ICD-10-CM

## 2024-11-15 DIAGNOSIS — R40.4 TRANSIENT ALTERATION OF AWARENESS: ICD-10-CM

## 2024-11-15 DIAGNOSIS — R40.4 TRANSIENT ALTERATION OF AWARENESS: Primary | ICD-10-CM

## 2024-11-15 PROCEDURE — 99205 OFFICE O/P NEW HI 60 MIN: CPT | Performed by: PSYCHIATRY & NEUROLOGY

## 2024-11-15 RX ORDER — ONDANSETRON 4 MG/1
4 TABLET, FILM COATED ORAL
Qty: 30 TABLET | Refills: 3 | Status: SHIPPED | OUTPATIENT
Start: 2024-11-15

## 2024-11-15 NOTE — PROGRESS NOTES
NEUROLOGY NEW PATIENT CONSULTATION      11/15/2024    RE: Casandra Boone    REFERRED BY:  Celso San MD    CHIEF COMPLAINT:  This is Casandra Boone is a 24 y.o. female  who had concerns including Follow-up and Seizures (POTS).    HPI:         History of Present Illness  The patient presents for evaluation of Postural Orthostatic Tachycardia Syndrome (POTS). She is accompanied by an adult female.    She was diagnosed with POTS in 08/2023 following a tilt table test and has been under the care of her cardiologist, Dr. Carr, since then. She believes there may be a neurological component to her condition that has not been fully addressed.    Her first fainting episode occurred at the age of 9, following a bout of mononucleosis. She also experiences fainting when exposed to heat or during exercise. A pediatric cardiologist previously attributed her symptoms to an immature system, given her tall stature, and found no heart-related issues.    She contracted COVID-19 in 2021, which exacerbated her fainting episodes to the point where they occurred every other day. She was bedridden for 8 days but did not require hospitalization. She contracted COVID-19 again in 2022, after which she has been unable to stand for extended periods or move around independently.    She experiences brain fog and has been on sertraline since the age of 15, which she finds helpful. She experiences lightheadedness daily and faints twice a week. She often feels as though she is about to faint but can usually lie down before it happens. During these episodes, she experiences vision and hearing loss but has never injured herself. She also experiences nausea and excessive sweating during these episodes.    She wears compression leggings up to her knees and has increased her salt intake, but these measures have not been effective. She does not believe she is hypermobile. She is currently taking propranolol 10 mg and midodrine 2.5 mg, which

## 2024-11-22 DIAGNOSIS — F34.1 PERSISTENT DEPRESSIVE DISORDER: ICD-10-CM

## 2024-11-22 NOTE — TELEPHONE ENCOUNTER
Pt needs this sent to mailorder pharmacy    Last appointment: 8/22/24  Next appointment: Advised to follow-up 2/22/25  Previous refill encounter(s): 8/22/24    Requested Prescriptions     Pending Prescriptions Disp Refills    sertraline (ZOLOFT) 25 MG tablet 90 tablet 1     Sig: Take 1 tablet by mouth daily Take daily with 50mg tablet for total daily dose of 75mg    sertraline (ZOLOFT) 50 MG tablet 90 tablet 1     Sig: Take 1 tablet by mouth daily Take daily with 25mg tablet for total daily dose of 75mg         For Pharmacy Admin Tracking Only    Program: Medication Refill  CPA in place:    Recommendation Provided To:   Intervention Detail: New Rx: 2, reason: Patient Preference  Intervention Accepted By:   Gap Closed?:    Time Spent (min): 5

## 2024-11-27 ENCOUNTER — PROCEDURE VISIT (OUTPATIENT)
Age: 24
End: 2024-11-27
Payer: COMMERCIAL

## 2024-11-27 DIAGNOSIS — R40.4 TRANSIENT ALTERATION OF AWARENESS: Primary | ICD-10-CM

## 2024-11-27 PROCEDURE — 95816 EEG AWAKE AND DROWSY: CPT | Performed by: PSYCHIATRY & NEUROLOGY

## 2024-11-27 RX ORDER — SERTRALINE HYDROCHLORIDE 25 MG/1
25 TABLET, FILM COATED ORAL DAILY
Qty: 90 TABLET | Refills: 0 | Status: SHIPPED | OUTPATIENT
Start: 2024-11-27

## 2024-11-27 NOTE — TELEPHONE ENCOUNTER
Refill request(s) approved--sertraline 50mg + 25mg daily--re-sent remaining refill to mail-order as requested.    Refill protocol details (computer-generated) reviewed, as available.      [facility] message to pt--to schedule follow-up appt.

## 2024-11-30 LAB
FOLATE SERPL-MCNC: 7.1 NG/ML
TSH SERPL DL<=0.005 MIU/L-ACNC: 1.5 UIU/ML (ref 0.45–4.5)
VIT B12 SERPL-MCNC: 656 PG/ML (ref 232–1245)

## 2024-12-01 LAB — TRYPTASE SERPL-MCNC: 4.5 UG/L (ref 2.2–13.2)

## 2024-12-02 LAB
CORTIS AM PEAK SERPL-MCNC: 16.4 UG/DL (ref 6.2–19.4)
METANEPH FREE SERPL-MCNC: 33 PG/ML (ref 0–88)
NORMETANEPHRINE SERPL-MCNC: 65.4 PG/ML (ref 0–210.1)

## 2024-12-05 LAB
DOPAMINE SERPL-MCNC: 36 PG/ML (ref 0–48)
EPINEPH PLAS-MCNC: 84 PG/ML (ref 0–62)
NOREPINEPH PLAS-MCNC: 331 PG/ML (ref 0–874)

## 2024-12-06 LAB
ALBUMIN SERPL ELPH-MCNC: 4 G/DL (ref 2.9–4.4)
ALBUMIN/GLOB SERPL: 1.3 {RATIO} (ref 0.7–1.7)
ALPHA1 GLOB SERPL ELPH-MCNC: 0.2 G/DL (ref 0–0.4)
ALPHA2 GLOB SERPL ELPH-MCNC: 0.7 G/DL (ref 0.4–1)
B-GLOBULIN SERPL ELPH-MCNC: 1.1 G/DL (ref 0.7–1.3)
GAMMA GLOB SERPL ELPH-MCNC: 1.3 G/DL (ref 0.4–1.8)
GLOBULIN SER-MCNC: 3.3 G/DL (ref 2.2–3.9)
IGA SERPL-MCNC: 127 MG/DL (ref 87–352)
IGG SERPL-MCNC: 1379 MG/DL (ref 586–1602)
IGM SERPL-MCNC: 154 MG/DL (ref 26–217)
INTERPRETATION SERPL IEP-IMP: NORMAL
LABORATORY COMMENT REPORT: NORMAL
M PROTEIN SERPL ELPH-MCNC: NORMAL G/DL
PROT SERPL-MCNC: 7.3 G/DL (ref 6–8.5)

## 2024-12-09 NOTE — PROGRESS NOTES
[facility]   Electroencephalogram Report    Procedure ID: [id number] Procedure Date: 11/27/2024   Patient Name: Casandra Boone YOB: 2000   Procedure Type: Routine Medical Record No: [id number]     An EEG is requested in this 24-year-old lady to evaluate for epileptiform abnormality.  Medication said to include midodrine, Zofran, Zoloft, Inderal    This tracing is obtained during the awake state.    During wakefulness, there are intermittent runs of posteriorly dominant and symmetrical low to medium amplitude 11 cps activities which attenuate with eye opening.  Lower voltage faster frequency activities are seen symmetrically over the anterior head regions.    Hyperventilation is not performed.    Intermittent photic stimulation induces symmetric posterior driving responses.    Sleep is not attained.    Interpretation  This EEG recorded during the awake state is normal.          Yolande Neves MD

## 2025-01-28 ENCOUNTER — TELEMEDICINE (OUTPATIENT)
Age: 25
End: 2025-01-28
Payer: COMMERCIAL

## 2025-01-28 DIAGNOSIS — F34.1 PERSISTENT DEPRESSIVE DISORDER: ICD-10-CM

## 2025-01-28 PROCEDURE — 99213 OFFICE O/P EST LOW 20 MIN: CPT | Performed by: INTERNAL MEDICINE

## 2025-01-28 RX ORDER — SERTRALINE HYDROCHLORIDE 25 MG/1
25 TABLET, FILM COATED ORAL DAILY
Qty: 90 TABLET | Refills: 0 | Status: CANCELLED | OUTPATIENT
Start: 2025-01-28

## 2025-01-28 RX ORDER — SERTRALINE HYDROCHLORIDE 100 MG/1
100 TABLET, FILM COATED ORAL DAILY
Qty: 90 TABLET | Refills: 3 | Status: SHIPPED | OUTPATIENT
Start: 2025-01-28

## 2025-01-28 SDOH — ECONOMIC STABILITY: FOOD INSECURITY: WITHIN THE PAST 12 MONTHS, THE FOOD YOU BOUGHT JUST DIDN'T LAST AND YOU DIDN'T HAVE MONEY TO GET MORE.: NEVER TRUE

## 2025-01-28 SDOH — ECONOMIC STABILITY: INCOME INSECURITY: IN THE LAST 12 MONTHS, WAS THERE A TIME WHEN YOU WERE NOT ABLE TO PAY THE MORTGAGE OR RENT ON TIME?: NO

## 2025-01-28 SDOH — ECONOMIC STABILITY: FOOD INSECURITY: WITHIN THE PAST 12 MONTHS, YOU WORRIED THAT YOUR FOOD WOULD RUN OUT BEFORE YOU GOT MONEY TO BUY MORE.: NEVER TRUE

## 2025-01-28 SDOH — ECONOMIC STABILITY: TRANSPORTATION INSECURITY
IN THE PAST 12 MONTHS, HAS THE LACK OF TRANSPORTATION KEPT YOU FROM MEDICAL APPOINTMENTS OR FROM GETTING MEDICATIONS?: NO

## 2025-01-28 SDOH — ECONOMIC STABILITY: TRANSPORTATION INSECURITY
IN THE PAST 12 MONTHS, HAS LACK OF TRANSPORTATION KEPT YOU FROM MEETINGS, WORK, OR FROM GETTING THINGS NEEDED FOR DAILY LIVING?: NO

## 2025-01-28 ASSESSMENT — PATIENT HEALTH QUESTIONNAIRE - PHQ9
SUM OF ALL RESPONSES TO PHQ QUESTIONS 1-9: 0
SUM OF ALL RESPONSES TO PHQ QUESTIONS 1-9: 0
1. LITTLE INTEREST OR PLEASURE IN DOING THINGS: NOT AT ALL
10. IF YOU CHECKED OFF ANY PROBLEMS, HOW DIFFICULT HAVE THESE PROBLEMS MADE IT FOR YOU TO DO YOUR WORK, TAKE CARE OF THINGS AT HOME, OR GET ALONG WITH OTHER PEOPLE: NOT DIFFICULT AT ALL
8. MOVING OR SPEAKING SO SLOWLY THAT OTHER PEOPLE COULD HAVE NOTICED. OR THE OPPOSITE, BEING SO FIGETY OR RESTLESS THAT YOU HAVE BEEN MOVING AROUND A LOT MORE THAN USUAL: NOT AT ALL
3. TROUBLE FALLING OR STAYING ASLEEP: NOT AT ALL
SUM OF ALL RESPONSES TO PHQ QUESTIONS 1-9: 0
SUM OF ALL RESPONSES TO PHQ9 QUESTIONS 1 & 2: 0
SUM OF ALL RESPONSES TO PHQ QUESTIONS 1-9: 0
5. POOR APPETITE OR OVEREATING: NOT AT ALL
2. FEELING DOWN, DEPRESSED OR HOPELESS: NOT AT ALL
9. THOUGHTS THAT YOU WOULD BE BETTER OFF DEAD, OR OF HURTING YOURSELF: NOT AT ALL
4. FEELING TIRED OR HAVING LITTLE ENERGY: NOT AT ALL
6. FEELING BAD ABOUT YOURSELF - OR THAT YOU ARE A FAILURE OR HAVE LET YOURSELF OR YOUR FAMILY DOWN: NOT AT ALL
7. TROUBLE CONCENTRATING ON THINGS, SUCH AS READING THE NEWSPAPER OR WATCHING TELEVISION: NOT AT ALL

## 2025-01-28 NOTE — PROGRESS NOTES
2025    TELEHEALTH EVALUATION -- Audio/Visual    ASSESSMENT/PLAN:   Diagnosis Orders   1. Persistent depressive disorder  sertraline (ZOLOFT) 100 MG tablet          Medication(s), management and follow-up based on response reviewed at visit.  She has already increased to this as below and tolerating fine.      Requested Prescriptions     Signed Prescriptions Disp Refills    sertraline (ZOLOFT) 100 MG tablet 90 tablet 3     Sig: Take 1 tablet by mouth daily       Return in about 1 year (around 2026), or if symptoms worsen or fail to improve, for medication follow-up--office visit.  reviewed medications and side effects in detail    Plan and evaluation (above) reviewed with pt at visit  Patient voiced understanding of plan and provided with time to ask/review questions.  After Visit Summary (AVS) provided to pt after visit with additional instructions as needed/reviewed.      AVS:  [x]  [facility] signed.  []  Mailed to patient after visit.  []  Not sent to patient after visit.      Future Appointments   Date Time Provider Department Center   3/7/2025 10:00 AM Dylan Pérez MD [id number] BS AMB   --Updated future visits after patient check-out.      HPI:    Casandra Boone (:  2000) has requested an audio/video evaluation for the following concern(s):  Chief Complaint   Patient presents with    Discuss Medications       Scheduled visit to review depression medications.  LOV was Aug 2024.  Dose reviewed and decreased then to 75mg daily as 50mg + 25mg tabs.    She had a temporary DMV form completed at office visit then as well.    Planned 6mo follow-up then.      She had been taking 100mg daily since Oct 2024.  She took 2 x 50 until gone, and 4 x 25mg        She had done 100mg daily x 10yrs prior.    Prefers to return to prior dose.    Refill to mail-order reviewed.    This is same mail-order she used previously for 50mg + 25mg doses.    She would prefer 90-day

## 2025-01-28 NOTE — PROGRESS NOTES
RM: VV  Chief Complaint   Patient presents with    Discuss Medications      There were no vitals filed for this visit.   FASTING: No  Have you been to the ER, urgent care clinic since your last visit?  Hospitalized since your last visit?\"    NO  “Have you seen or consulted any other health care providers outside of [facility] since your last visit?”    NO    Click Here for Release of Records Request         1/28/2025     3:56 PM   Patient-Reported Vitals   Patient-Reported Weight 185   Patient-Reported Height 5’10
